# Patient Record
(demographics unavailable — no encounter records)

---

## 2025-02-26 NOTE — HEALTH RISK ASSESSMENT
[Very Good] : ~his/her~  mood as very good [Yes] : Yes [Monthly or less (1 pt)] : Monthly or less (1 point) [1 or 2 (0 pts)] : 1 or 2 (0 points) [Never (0 pts)] : Never (0 points) [No falls in past year] : Patient reported no falls in the past year [0] : 2) Feeling down, depressed, or hopeless: Not at all (0) [PHQ-2 Negative - No further assessment needed] : PHQ-2 Negative - No further assessment needed [Never] : Never [With Significant Other] : lives with significant other [Employed] : employed [] :  [Feels Safe at Home] : Feels safe at home [Fully functional (bathing, dressing, toileting, transferring, walking, feeding)] : Fully functional (bathing, dressing, toileting, transferring, walking, feeding) [Fully functional (using the telephone, shopping, preparing meals, housekeeping, doing laundry, using] : Fully functional and needs no help or supervision to perform IADLs (using the telephone, shopping, preparing meals, housekeeping, doing laundry, using transportation, managing medications and managing finances) [Seat Belt] :  uses seat belt [[id number]] : 0 [Reports changes in hearing] : Reports no changes in hearing [Reports changes in vision] : Reports no changes in vision [Reports changes in dental health] : Reports no changes in dental health [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

## 2025-02-26 NOTE — ASSESSMENT
[FreeTextEntry1] : HEALTH CARE MAINTENANCE - Influenza vaccine: TODAY - Covid vaccine: Vaccinated - HIV: Defer - HEP C: Today - HBV: UTD - TDAP: UTD 2023  RTC 1 year or earlier prn

## 2025-02-26 NOTE — HISTORY OF PRESENT ILLNESS
[FreeTextEntry1] : CPE [de-identified] : LENIN MONGE is a 34 year M with no significant PMHx presenting for CPE. Feels well.  1) Onychomycosis on R 4th toe for several months - requesting treatment  Diet/exercise: 5days week at [employer] classes, cycling. Avoids shellfish. includes fruits and vegetables, notes he limits "bad foods" but watches his portion. Social history: [employer] Et[employer] use, no tobacco or drug use. lives with wife in [place]. works in [employer] ([employer]).

## 2025-04-01 NOTE — HISTORY OF PRESENT ILLNESS
[FreeTextEntry1] : NP - Rash on scalp [de-identified] : Prashanth Tate 33 y/o M presents for rash on scalp dry, flaky has used lidex soln in the past has tried selsun blue, H&S shampoo  PHx of skin cancer: No FHx of skin cancer: No  Hx of blistering sunburns: No  Hx of tanning bed use: No Uses sunscreen regularly: No

## 2025-04-01 NOTE — ASSESSMENT
[FreeTextEntry1] : #Seborrheic dermatitis, scalp, chronic, flaring -I discussed the chronic nature and course of this condition -Start Ketoconazole 2% shampoo 2-3 times a week to the scalp. Leave on for at least 5 mins before rinsing out. Potential SE reviewed including dryness, irritation. Alternate with head and shoulders or DHS zinc shampoo, or Neutrogena T-sal shampoo. -Start clobetasol solution daily to AA until improved then as needed, SED, do not get on face.  RTC 2 mos